# Patient Record
Sex: FEMALE | Race: WHITE | NOT HISPANIC OR LATINO | Employment: FULL TIME | URBAN - METROPOLITAN AREA
[De-identification: names, ages, dates, MRNs, and addresses within clinical notes are randomized per-mention and may not be internally consistent; named-entity substitution may affect disease eponyms.]

---

## 2022-04-19 ENCOUNTER — MEDICAL CORRESPONDENCE (OUTPATIENT)
Dept: HEALTH INFORMATION MANAGEMENT | Facility: CLINIC | Age: 42
End: 2022-04-19
Payer: COMMERCIAL

## 2022-04-20 ENCOUNTER — TELEPHONE (OUTPATIENT)
Dept: DERMATOLOGY | Facility: CLINIC | Age: 42
End: 2022-04-20
Payer: COMMERCIAL

## 2022-04-21 ENCOUNTER — TRANSCRIBE ORDERS (OUTPATIENT)
Dept: OTHER | Age: 42
End: 2022-04-21
Payer: COMMERCIAL

## 2022-04-21 DIAGNOSIS — C43.9 MALIGNANT MELANOMA, UNSPECIFIED SITE (H): Primary | ICD-10-CM

## 2022-04-26 ENCOUNTER — TELEPHONE (OUTPATIENT)
Dept: DERMATOLOGY | Facility: CLINIC | Age: 42
End: 2022-04-26
Payer: COMMERCIAL

## 2022-04-26 NOTE — TELEPHONE ENCOUNTER
Left patient a voicemail to schedule a consult & mohs for melanoma in situ of left helix with Dr. Brennan. Provided my direct phone number.    Gave her some options for telephone consults next week. Advised her that I could not hold them and to call me or send a mychart asap If she can do any of them.    Savi Montana, Procedure

## 2022-04-28 NOTE — TELEPHONE ENCOUNTER
FUTURE VISIT INFORMATION      FUTURE VISIT INFORMATION:    Date: 5.9.22    Time: 3:15    Location: Telephone  REFERRAL INFORMATION:    Referring provider:  [place]    Referring providers clinic:  [facility]    Reason for visit/diagnosis  melanoma in situ of left helix needs asap melanoma slot    RECORDS REQUESTED FROM:       Clinic name Comments Records Status Photos Status   [facility] 2.18.22  Path # [id number] CE Received

## 2022-05-09 ENCOUNTER — PRE VISIT (OUTPATIENT)
Dept: DERMATOLOGY | Facility: CLINIC | Age: 42
End: 2022-05-09

## 2022-05-09 ENCOUNTER — VIRTUAL VISIT (OUTPATIENT)
Dept: DERMATOLOGY | Facility: CLINIC | Age: 42
End: 2022-05-09
Payer: COMMERCIAL

## 2022-05-09 DIAGNOSIS — D03.22: Primary | ICD-10-CM

## 2022-05-09 PROCEDURE — 99204 OFFICE O/P NEW MOD 45 MIN: CPT | Mod: 95 | Performed by: DERMATOLOGY

## 2022-05-09 ASSESSMENT — PAIN SCALES - GENERAL: PAINLEVEL: NO PAIN (0)

## 2022-05-09 NOTE — LETTER
Date:May 10, 2022      Provider requested that no letter be sent. Do not send.       [facility]

## 2022-05-09 NOTE — PROGRESS NOTES
Mohs Micrographic Surgery Consult Note    May 9, 2022  Start time (if telephone encounter): 3:15 p.m.  End time (if telephone encounter): 3:25 p.m.    Dermatology Problem List:  1. 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    Skin cancer(s): Melanoma in situ  Location(s): left helix  Associated symptoms: pruritus, tenderness to touch  Onset: within last 1 year    No other associated symptoms, modifying factors, or prior treatments, except when noted above. The patient denies any constitutional symptoms, lymphadenopathy, unintentional weight loss or decreased appetite. No other skin concerns today.    Objective:   - No photos submitted    Assessment and Plan:     1. Plan for Mohs micrographic surgery for skin cancer(s) above:  - We discussed the nature of the diagnosis/condition above. We discussed the treatment options, including the risks benefits and expectations of these options. We recommend micrographic surgery as the most effective and most tissue sparing option for treatment, and the patient agrees to proceed with this.  The patient is aware of the risks, benefits and expectations of this procedure. The patient will be scheduled for this procedure, if not already done so.  - We anticipate the following closure type: Sliding or lifting flap    The patient was discussed with and evaluated by attending physician, Henri Brennan MD.    Yoshi Hernández MD  Micrographic Surgery and Dermatologic Oncology (MSDO) Fellow    Scribe Disclosure:  I, David Parkinson, am serving as a scribe to document services personally performed by Henri Brennan MD based on data collection and the provider's statements to me.     Attending Attestation  I attest that I discussed the case with the Fellow.  I agree with the plan.   I have reviewed the note and edited it as necessary.    Henri WHEELER  Mika HUFF  Professor  Director of Dermatologic Surgery  Department of Dermatology  [facility]

## 2022-05-09 NOTE — LETTER
5/9/2022       RE: Carlton Perez  [address]   [place] [address]     Dear Colleague,    Thank you for referring your patient, Carlton Perez, to the [facility] DERMATOLOGIC SURGERY CLINIC [place] at [facility]. Please see a copy of my visit note below.    Mohs Micrographic Surgery Consult Note    May 9, 2022  Start time (if telephone encounter): 3:15 p.m.  End time (if telephone encounter): 3:25 p.m.    Dermatology Problem List:  1. 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    Skin cancer(s): Melanoma in situ  Location(s): left helix  Associated symptoms: pruritus, tenderness to touch  Onset: within last 1 year    No other associated symptoms, modifying factors, or prior treatments, except when noted above. The patient denies any constitutional symptoms, lymphadenopathy, unintentional weight loss or decreased appetite. No other skin concerns today.    Objective:   - No photos submitted    Assessment and Plan:     1. Plan for Mohs micrographic surgery for skin cancer(s) above:  - We discussed the nature of the diagnosis/condition above. We discussed the treatment options, including the risks benefits and expectations of these options. We recommend micrographic surgery as the most effective and most tissue sparing option for treatment, and the patient agrees to proceed with this.  The patient is aware of the risks, benefits and expectations of this procedure. The patient will be scheduled for this procedure, if not already done so.  - We anticipate the following closure type: Sliding or lifting flap    The patient was discussed with and evaluated by attending physician, Henri Brennan MD.    Yoshi Hernández MD  Micrographic Surgery and Dermatologic Oncology (MSDO) Fellow    Scribe Disclosure:  David FRANKEL  Iggy, am serving as a scribe to document services personally performed by Henri Brennan MD based on data collection and the provider's statements to me.     Attending Attestation  I attest that I discussed the case with the Fellow.  I agree with the plan.   I have reviewed the note and edited it as necessary.    Henri Brennan M.D.  Professor  Director of Dermatologic Surgery  Department of Dermatology  [facility]        Again, thank you for allowing me to participate in the care of your patient.      Sincerely,    Henri Brennan MD

## 2022-05-09 NOTE — NURSING NOTE
Chief Complaint   Patient presents with     Derm Problem     Consult for mohs on left ear.      eNena DASILVA CMA

## 2022-05-18 ENCOUNTER — OFFICE VISIT (OUTPATIENT)
Dept: DERMATOLOGY | Facility: CLINIC | Age: 42
End: 2022-05-18
Payer: COMMERCIAL

## 2022-05-18 VITALS — SYSTOLIC BLOOD PRESSURE: 132 MMHG | DIASTOLIC BLOOD PRESSURE: 94 MMHG | HEART RATE: 75 BPM

## 2022-05-18 DIAGNOSIS — D03.21: ICD-10-CM

## 2022-05-18 PROCEDURE — 17311 MOHS 1 STAGE H/N/HF/G: CPT | Performed by: DERMATOLOGY

## 2022-05-18 PROCEDURE — 14061 TIS TRNFR E/N/E/L10.1-30SQCM: CPT | Performed by: DERMATOLOGY

## 2022-05-18 PROCEDURE — 88305 TISSUE EXAM BY PATHOLOGIST: CPT | Mod: 26 | Performed by: PATHOLOGY

## 2022-05-18 PROCEDURE — 88342 IMHCHEM/IMCYTCHM 1ST ANTB: CPT | Mod: TC | Performed by: DERMATOLOGY

## 2022-05-18 PROCEDURE — 88342 IMHCHEM/IMCYTCHM 1ST ANTB: CPT | Mod: 26 | Performed by: PATHOLOGY

## 2022-05-18 ASSESSMENT — PAIN SCALES - GENERAL: PAINLEVEL: NO PAIN (0)

## 2022-05-18 NOTE — LETTER
5/18/2022       RE: Carlton Perez  [address]   [place] [address]     Dear Colleague,    Thank you for referring your patient, Carlton Perez, to the [facility] DERMATOLOGIC SURGERY CLINIC [place] at [facility]. Please see a copy of my visit note below.    [facility] Mohs Surgery Procedure Note    Case #: 1  Date of Service:  May 18, 2022  Surgery: Mohs micrographic surgery (MMS)  Staff surgeon: Henri Brennan MD  Fellow surgeon: None  Resident surgeon: None  Nurse: Neena Foley CMA    Tumor Type: Melanoma in situ (MIS)  Location: L helix  Derm-Path Accession #: [id number]    Mohs Accession #:   Pre-Op Size: 0.6 cm x 0.6 cm  Final Defect Size: 2 cm x 2 cm  Number of Mohs stages: 1  Level of Defect: Cartilage  Local anesthetic: 6 mL 1% lidocaine with epinephrine  Repair Type: Bilobedt flap  Repair Size: 4 cm x 3 cm  Suture Material: 4-0 Vicryl; 5-0 Monocryl; 6-0 fast absorbing gut    Procedure:    Stage I  We discussed the principles of treatment and most likely complications including scarring, bleeding, infection, swelling, pain, crusting, nerve damage, large wound,  incomplete excision, wound dehiscence,  nerve damage, recurrence, and a second procedure may be recommended to obtain the best cosmetic or functional result.    Informed consent was obtained and the patient underwent the procedure as follows:  The patient was placed supine on the operating table.  The cancer was identified, outlined with a marker, and verified by the patient.  The entire surgical field was prepped with chlorhexidine.  The surgical site was anesthetized using lidocaine with epinephrine.    The area of clinically apparent tumor was excised and sent for permanent sections to rule out invasive melanoma. The peripheral rim of tissue was then surgically excised using a #15 blade and was then transferred onto a specimen sheet maintaining the orientation of  the specimen. Hemostasis was obtained using bipolar electrocoagulation. The wound site was then covered with a dressing while the tissue samples were processed for examination.    The excised tissue was transported to the Mohs histology laboratory maintaining the tissue orientation.  The tissue specimen was relaxed so that the entire surgical margin was in a a single horizontal plane for sectioning and inked for precise mapping.  A precise reference map was drawn to reflect the sectioning of the specimen, colored inking of the margins, and orientation on the patient. The tissue was processed using horizontal sectioning of the base and continuous peripheral margins. Vertical, bread loafed sections were obtained from the central portion of the lesion, prior to being sent for permament sections. A control biopsy at the L preauricular was taken to compare the density and periodicity of melanocytes along the dermal-epidermal junction.     The tissue sections were stained with Hematoxylin and Eosin (H&E), as well as Melanoma antigen recognized by T cells or Melan-A (MART-1) stain. The histopathologic sections were reviewed in conjunction with the reference map.     Total blocks: 3   Total slides:  11    Within the central portion of the lesion, there was increased density and periodicity of atypical-appearing melanocytes with areas of confluence at the epidermis, consistent with diagnosis above.    Was the skin lesion clear at this stage?: Yes, the skin lesion was determined clear at periphery at this stage: There was a relatively normal periodicity and density of melanocytes along the dermal-epidermal junction noted at the peripheral margins, therefore Mohs surgery was complete.    REPAIR: Bilobed Transposition Flap    The patient was taken to the operative suite and placed supine on the operating room table.  The wound was identified and infiltrated with 1% lidocaine with epinephrine.  The defect was then cleansed and  prepped with chlorhexidine and draped with sterile drapes.  Using a marker, a bilobed transposition flap repair was planned.  The wound edges were then debeveled and the wound was undermined bluntly in all directions. The transposition flap was incised sharply to the level of the subcutaneous fat.  The flap was undermined from all surrounding tissue. Hemostasis was obtained with electrocoagulation.  The flap was transposed into the primary defect.  The secondary defect and flap were closed with deep dermal 5-0 Monocryl sutures.  Epidermal tissue was carefully approximated using 6-0 fast absorbing gut in a simple running fashion throughout the length of the flap.  Redundant skin was excised by the triangulation technique and closed in similar fashion.  The wound was cleansed with sterile saline and Vaseline was applied. A sterile non-adherent pressure dressing was placed.  The patient left the operating suite in stable condition.    Follow-up for suture removal: Not applicable as only dissolving sutures used    Henri Brennan MD performed the procedure.       Scribe Disclosure:  I, Nicole Stark, am serving as a scribe to document services personally performed by Henri Brennan MD based on data collection and the provider's statements to me.     Attending attestation:  I personally performed the entire procedure.  I have reviewed the note and edited it as necessary, and agree with its contents.    Henri Brennan M.D.  Professor  Director of Dermatologic Surgery  Department of Dermatology  [facility]    Dermatology Surgery Clinic  [facility]  [address] [place], [place] [id number]        Again, thank you for allowing me to participate in the care of your patient.      Sincerely,    Henri Brennan MD

## 2022-05-18 NOTE — PATIENT INSTRUCTIONS
Wound Care After a Biopsy    What is a skin biopsy?  A skin biopsy allows the doctor to examine a very small piece of tissue under the microscope to determine the diagnosis and the best treatment for the skin condition. A local anesthetic (numbing medicine)  is injected with a very small needle into the skin area to be tested. A small piece of skin is taken from the area. Sometimes a suture (stitch) is used.     What are the risks of a skin biopsy?  I will experience scar, bleeding, swelling, pain, crusting and redness. I may experience incomplete removal or recurrence. Risks of this procedure are excessive bleeding, bruising, infection, nerve damage, numbness, thick (hypertrophic or keloidal) scar and non-diagnostic biopsy.    How should I care for my wound for the first 24 hours?  Keep the wound dry and covered for 24 hours  If it bleeds, hold direct pressure on the area for 15 minutes. If bleeding does not stop then go to the emergency room  Avoid strenuous exercise the first 1-2 days or as your doctor instructs you    How should I care for the wound after 24 hours?  After 24 hours, remove the bandage  You may bathe or shower as normal  If you had a scalp biopsy, you can shampoo as usual and can use shower water to clean the biopsy site daily  Clean the wound twice a day with gentle soap and water  Do not scrub, be gentle  Apply white petroleum/Vaseline after cleaning the wound with a cotton swab or a clean finger, and keep the site covered with a Bandaid /bandage. Bandages are not necessary with a scalp biopsy  If you are unable to cover the site with a Bandaid /bandage, re-apply ointment 2-3 times a day to keep the site moist. Moisture will help with healing  Avoid strenuous activity for first 1-2 days  Avoid lakes, rivers, pools, and oceans until the stitches are removed or the site is healed    How do I clean my wound?  Wash hands thoroughly with soap or use hand  before all wound care  Clean the  wound with gentle soap and water  Apply white petroleum/Vaseline  to wound after it is clean  Replace the Bandaid /bandage to keep the wound covered for the first few days or as instructed by your doctor  If you had a scalp biopsy, warm shower water to the area on a daily basis should suffice    What should I use to clean my wound?   Cotton-tipped applicators (Qtips )  White petroleum jelly (Vaseline ). Use a clean new container and use Q-tips to apply.  Bandaids   as needed  Gentle soap     How should I care for my wound long term?  Do not get your wound dirty  Keep up with wound care for one week or until the area is healed.  A small scab will form and fall off by itself when the area is completely healed. The area will be red and will become pink in color as it heals. Sun protection is very important for how your scar will turn out. Sunscreen with an SPF 30 or greater is recommended once the area is healed.  If you have stitches, stitches need to be removed in days. You may return to our clinic for this or you may have it done locally at your doctor s office.  You should have some soreness but it should be mild and slowly go away over several days. Talk to your doctor about using tylenol for pain,    When should I call my doctor?  If you have increased:   Pain or swelling  Pus or drainage (clear or slightly yellow drainage is ok)  Temperature over 100F  Spreading redness or warmth around wound    When will I hear about my results?  The biopsy results can take 2 weeks to come back.  Your results will automatically release to [employer] before your provider has even reviewed them.  The clinic will call you with the results, send you a [employer] message, or have you schedule a follow-up clinic or phone time to discuss the results.  Contact our clinics if you do not hear from us in 2 weeks.    Who should I call with questions?  [facility]: [phone]  [facility],  [place]: [phone]  For urgent needs outside of business hours call the [facility] at [phone] and ask for the dermatology resident on call

## 2022-05-18 NOTE — PROGRESS NOTES
[facility] Mohs Surgery Procedure Note    Case #: 1  Date of Service:  May 18, 2022  Surgery: Mohs micrographic surgery (MMS)  Staff surgeon: Henri Brennan MD  Fellow surgeon: None  Resident surgeon: None  Nurse: Neena Foley CMA    Tumor Type: Melanoma in situ (MIS)  Location: L helix  Derm-Path Accession #: [id number]    Mohs Accession #:   Pre-Op Size: 0.6 cm x 0.6 cm  Final Defect Size: 2 cm x 2 cm  Number of Mohs stages: 1  Level of Defect: Cartilage  Local anesthetic: 6 mL 1% lidocaine with epinephrine  Repair Type: Bilobedt flap  Repair Size: 4 cm x 3 cm  Suture Material: 4-0 Vicryl; 5-0 Monocryl; 6-0 fast absorbing gut    Procedure:    Stage I  We discussed the principles of treatment and most likely complications including scarring, bleeding, infection, swelling, pain, crusting, nerve damage, large wound,  incomplete excision, wound dehiscence,  nerve damage, recurrence, and a second procedure may be recommended to obtain the best cosmetic or functional result.    Informed consent was obtained and the patient underwent the procedure as follows:  The patient was placed supine on the operating table.  The cancer was identified, outlined with a marker, and verified by the patient.  The entire surgical field was prepped with chlorhexidine.  The surgical site was anesthetized using lidocaine with epinephrine.    The area of clinically apparent tumor was excised and sent for permanent sections to rule out invasive melanoma. The peripheral rim of tissue was then surgically excised using a #15 blade and was then transferred onto a specimen sheet maintaining the orientation of the specimen. Hemostasis was obtained using bipolar electrocoagulation. The wound site was then covered with a dressing while the tissue samples were processed for examination.    The excised tissue was transported to the [facility] histology laboratory maintaining the tissue orientation.  The tissue specimen was relaxed so  that the entire surgical margin was in a a single horizontal plane for sectioning and inked for precise mapping.  A precise reference map was drawn to reflect the sectioning of the specimen, colored inking of the margins, and orientation on the patient. The tissue was processed using horizontal sectioning of the base and continuous peripheral margins. Vertical, bread loafed sections were obtained from the central portion of the lesion, prior to being sent for permament sections. A control biopsy at the L preauricular was taken to compare the density and periodicity of melanocytes along the dermal-epidermal junction.     The tissue sections were stained with Hematoxylin and Eosin (H&E), as well as Melanoma antigen recognized by T cells or Melan-A (MART-1) stain. The histopathologic sections were reviewed in conjunction with the reference map.     Total blocks: 3   Total slides:  11    Within the central portion of the lesion, there was increased density and periodicity of atypical-appearing melanocytes with areas of confluence at the epidermis, consistent with diagnosis above.    Was the skin lesion clear at this stage?: Yes, the skin lesion was determined clear at periphery at this stage: There was a relatively normal periodicity and density of melanocytes along the dermal-epidermal junction noted at the peripheral margins, therefore Mohs surgery was complete.    REPAIR: Bilobed Transposition Flap    The patient was taken to the operative suite and placed supine on the operating room table.  The wound was identified and infiltrated with 1% lidocaine with epinephrine.  The defect was then cleansed and prepped with chlorhexidine and draped with sterile drapes.  Using a marker, a bilobed transposition flap repair was planned.  The wound edges were then debeveled and the wound was undermined bluntly in all directions. The transposition flap was incised sharply to the level of the subcutaneous fat.  The flap was  undermined from all surrounding tissue. Hemostasis was obtained with electrocoagulation.  The flap was transposed into the primary defect.  The secondary defect and flap were closed with deep dermal 5-0 Monocryl sutures.  Epidermal tissue was carefully approximated using 6-0 fast absorbing gut in a simple running fashion throughout the length of the flap.  Redundant skin was excised by the triangulation technique and closed in similar fashion.  The wound was cleansed with sterile saline and Vaseline was applied. A sterile non-adherent pressure dressing was placed.  The patient left the operating suite in stable condition.    Follow-up for suture removal: Not applicable as only dissolving sutures used    Henri Brennan MD performed the procedure.       Scribe Disclosure:  I, Nicole Stark, am serving as a scribe to document services personally performed by Henri Brennan MD based on data collection and the provider's statements to me.     Attending attestation:  I personally performed the entire procedure.  I have reviewed the note and edited it as necessary, and agree with its contents.    Henri Brennan M.D.  Professor  Director of Dermatologic Surgery  Department of Dermatology  [facility]    Dermatology Surgery Clinic  [facility]  [address] [place], [place] [id number]

## 2022-05-18 NOTE — NURSING NOTE
Chief Complaint   Patient presents with     Derm Problem     Patient is here for melanoma removal in the left ear.     Vicki BADILLO RN

## 2022-05-18 NOTE — LETTER
Date:May 19, 2022      Provider requested that no letter be sent. Do not send.       [facility]

## 2022-05-25 LAB
PATH REPORT.COMMENTS IMP SPEC: NORMAL
PATH REPORT.COMMENTS IMP SPEC: NORMAL
PATH REPORT.FINAL DX SPEC: NORMAL
PATH REPORT.GROSS SPEC: NORMAL
PATH REPORT.MICROSCOPIC SPEC OTHER STN: NORMAL
PATH REPORT.RELEVANT HX SPEC: NORMAL

## 2022-06-11 ENCOUNTER — HEALTH MAINTENANCE LETTER (OUTPATIENT)
Age: 42
End: 2022-06-11

## 2022-10-16 ENCOUNTER — HEALTH MAINTENANCE LETTER (OUTPATIENT)
Age: 42
End: 2022-10-16

## 2022-10-17 ENCOUNTER — LAB REQUISITION (OUTPATIENT)
Dept: LAB | Facility: CLINIC | Age: 42
End: 2022-10-17

## 2022-10-17 LAB
HBV SURFACE AB SERPL IA-ACNC: >1000 M[IU]/ML
HBV SURFACE AB SERPL IA-ACNC: REACTIVE M[IU]/ML
HCV AB SERPL QL IA: NONREACTIVE
HIV 1+2 AB+HIV1 P24 AG SERPL QL IA: NONREACTIVE

## 2022-10-17 PROCEDURE — 87389 HIV-1 AG W/HIV-1&-2 AB AG IA: CPT | Performed by: INTERNAL MEDICINE

## 2022-10-17 PROCEDURE — 86803 HEPATITIS C AB TEST: CPT | Performed by: INTERNAL MEDICINE

## 2022-10-17 PROCEDURE — 86706 HEP B SURFACE ANTIBODY: CPT | Performed by: INTERNAL MEDICINE

## 2022-11-30 ENCOUNTER — LAB REQUISITION (OUTPATIENT)
Dept: LAB | Facility: CLINIC | Age: 42
End: 2022-11-30

## 2022-11-30 PROCEDURE — 87389 HIV-1 AG W/HIV-1&-2 AB AG IA: CPT | Performed by: INTERNAL MEDICINE

## 2022-12-01 LAB — HIV 1+2 AB+HIV1 P24 AG SERPL QL IA: NONREACTIVE

## 2023-06-07 ENCOUNTER — LAB REQUISITION (OUTPATIENT)
Dept: LAB | Facility: CLINIC | Age: 43
End: 2023-06-07

## 2023-06-07 LAB — HIV 1+2 AB+HIV1 P24 AG SERPL QL IA: NONREACTIVE

## 2023-06-07 PROCEDURE — 87389 HIV-1 AG W/HIV-1&-2 AB AG IA: CPT | Performed by: INTERNAL MEDICINE

## 2023-06-17 ENCOUNTER — HEALTH MAINTENANCE LETTER (OUTPATIENT)
Age: 43
End: 2023-06-17

## 2024-03-23 ENCOUNTER — HEALTH MAINTENANCE LETTER (OUTPATIENT)
Age: 44
End: 2024-03-23

## 2024-08-10 ENCOUNTER — HEALTH MAINTENANCE LETTER (OUTPATIENT)
Age: 44
End: 2024-08-10

## 2024-11-24 NOTE — TELEPHONE ENCOUNTER
Health Call Center    Phone Message    May a detailed message be left on voicemail: yes     Reason for Call: Other: The pt has been referred for Malignant melanoma, unspecified site (HC)  . The referral is in the chart review area of [facility]. Please review and call to schedule the pt with Dr. Brennan for this consult / procedure. 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 She does have My Chart. Thanks.     Action Taken: Message routed to:  [facility] ([facility] derm surg    Travel Screening: Not Applicable                                                                        
Called patient and left a voicemail to let her know that the last name we have on file and the last name on the path report we received are different. She will need to correct this with registration. I told her to call the call center and once this is corrected we can schedule her consult with Dr. Mika Montana, Procedure     
Path report and pictures were requested, just waiting to receive them before contacting the patient    Savi Montana Procedure     
see mdm